# Patient Record
Sex: FEMALE | Race: OTHER | HISPANIC OR LATINO | Employment: FULL TIME | URBAN - METROPOLITAN AREA
[De-identification: names, ages, dates, MRNs, and addresses within clinical notes are randomized per-mention and may not be internally consistent; named-entity substitution may affect disease eponyms.]

---

## 2021-07-22 ENCOUNTER — APPOINTMENT (OUTPATIENT)
Dept: LAB | Facility: HOSPITAL | Age: 26
End: 2021-07-22
Payer: COMMERCIAL

## 2021-07-22 DIAGNOSIS — N91.2 AMENORRHEA: ICD-10-CM

## 2021-07-22 LAB
B-HCG SERPL-ACNC: <2 MIU/ML
FSH SERPL-ACNC: 3 MIU/ML
GLUCOSE SERPL-MCNC: 88 MG/DL (ref 65–140)
PROLACTIN SERPL-MCNC: 11.8 NG/ML
TESTOST SERPL-MCNC: 15 NG/DL
TSH SERPL DL<=0.05 MIU/L-ACNC: 2.37 UIU/ML (ref 0.36–3.74)

## 2021-07-22 PROCEDURE — 84403 ASSAY OF TOTAL TESTOSTERONE: CPT

## 2021-07-22 PROCEDURE — 82947 ASSAY GLUCOSE BLOOD QUANT: CPT

## 2021-07-22 PROCEDURE — 84146 ASSAY OF PROLACTIN: CPT

## 2021-07-22 PROCEDURE — 84702 CHORIONIC GONADOTROPIN TEST: CPT

## 2021-07-22 PROCEDURE — 36415 COLL VENOUS BLD VENIPUNCTURE: CPT

## 2021-07-22 PROCEDURE — 83001 ASSAY OF GONADOTROPIN (FSH): CPT

## 2021-07-22 PROCEDURE — 84443 ASSAY THYROID STIM HORMONE: CPT

## 2022-03-18 ENCOUNTER — HOSPITAL ENCOUNTER (EMERGENCY)
Facility: HOSPITAL | Age: 27
Discharge: HOME | End: 2022-03-18
Attending: EMERGENCY MEDICINE | Admitting: EMERGENCY MEDICINE
Payer: MEDICARE

## 2022-03-18 VITALS
WEIGHT: 229.28 LBS | TEMPERATURE: 98.3 F | DIASTOLIC BLOOD PRESSURE: 90 MMHG | OXYGEN SATURATION: 100 % | HEART RATE: 72 BPM | RESPIRATION RATE: 16 BRPM | SYSTOLIC BLOOD PRESSURE: 124 MMHG

## 2022-03-18 DIAGNOSIS — H57.89 CONTACT LENS STUCK: Primary | ICD-10-CM

## 2022-03-18 PROCEDURE — 65205 REMOVE FOREIGN BODY FROM EYE: CPT | Performed by: EMERGENCY MEDICINE

## 2022-03-18 PROCEDURE — 99283 EMERGENCY DEPT VISIT LOW MDM: CPT

## 2022-03-18 PROCEDURE — 99284 EMERGENCY DEPT VISIT MOD MDM: CPT | Performed by: EMERGENCY MEDICINE

## 2022-03-18 RX ORDER — TETRACAINE HYDROCHLORIDE 5 MG/ML
1 SOLUTION OPHTHALMIC ONCE
Status: COMPLETED | OUTPATIENT
Start: 2022-03-18 | End: 2022-03-18

## 2022-03-18 RX ADMIN — FLUORESCEIN SODIUM 1 STRIP: 0.6 STRIP OPHTHALMIC at 18:26

## 2022-03-18 RX ADMIN — TETRACAINE HYDROCHLORIDE 1 DROP: 5 SOLUTION OPHTHALMIC at 18:26

## 2022-03-18 NOTE — DISCHARGE INSTRUCTIONS
You were seen in the ED after losing a contact  It was retrieved  There were no abrasions noted following removal      Please follow up with your PCP  If you develop eye pain, you may return to the ED or follow up with ophthalmology  Return to the ED for any new or concerning symptoms

## 2022-03-19 NOTE — ED PROVIDER NOTES
History  Chief Complaint   Patient presents with    Eye Pain     pt rubbed her eye hard and her contacts went up under the eyelid     Patient is a 32 YO F, no significant PMHx, who presents to the ED after her contact lens accidentally became stuck under her R eye lid  Patient states this occurred just prior to arrival while rubbing her eyes  After noticing her contact lens was missing, she felt pain under her right eyelid and felt something under there  When she was unable to retrieve it on her own, she came to the ED for further management  Patient states she wears daily contact lenses and this was a new pair  Denies any vision changes  Denies any other new or concerning symptoms  None       Past Medical History:   Diagnosis Date    Miscarriage 02/16/2021       History reviewed  No pertinent surgical history  History reviewed  No pertinent family history  I have reviewed and agree with the history as documented  E-Cigarette/Vaping     E-Cigarette/Vaping Substances     Social History     Tobacco Use    Smoking status: Never Smoker    Smokeless tobacco: Not on file   Substance Use Topics    Alcohol use: Yes     Alcohol/week: 1 0 standard drink     Types: 1 Glasses of wine per week    Drug use: Never        Review of Systems   Eyes: Positive for pain  Negative for photophobia and visual disturbance  Gastrointestinal: Negative for nausea and vomiting  Skin: Negative for wound  Neurological: Negative for headaches  All other systems reviewed and are negative        Physical Exam  ED Triage Vitals [03/18/22 1748]   Temperature Pulse Respirations Blood Pressure SpO2   98 3 °F (36 8 °C) 72 16 124/90 100 %      Temp Source Heart Rate Source Patient Position - Orthostatic VS BP Location FiO2 (%)   Oral Monitor Sitting Right arm --      Pain Score       --             Orthostatic Vital Signs  Vitals:    03/18/22 1748   BP: 124/90   Pulse: 72   Patient Position - Orthostatic VS: Sitting Physical Exam  Vitals and nursing note reviewed  Constitutional:       General: She is not in acute distress  Appearance: She is well-developed  She is not diaphoretic  HENT:      Head: Normocephalic and atraumatic  Right Ear: External ear normal       Left Ear: External ear normal       Nose: Nose normal    Eyes:      General: Lids are normal  No scleral icterus  Right eye: Foreign body present  Extraocular Movements: Extraocular movements intact  Right eye: Normal extraocular motion  Left eye: Normal extraocular motion  Conjunctiva/sclera:      Right eye: Hemorrhage present  Pupils: Pupils are equal, round, and reactive to light  Right eye: No corneal abrasion or fluorescein uptake  Comments: Right eyelid everted, torn contact lens found  Two pieces removed without difficulty  No residual foreign bodies  Pulmonary:      Effort: Pulmonary effort is normal  No respiratory distress  Musculoskeletal:         General: Normal range of motion  Cervical back: Normal range of motion and neck supple  Skin:     General: Skin is warm and dry  Neurological:      General: No focal deficit present  Mental Status: She is alert        Gait: Gait normal    Psychiatric:         Mood and Affect: Mood normal          Behavior: Behavior normal          ED Medications  Medications   tetracaine 0 5 % ophthalmic solution 1 drop (1 drop Right Eye Given by Other 3/18/22 1826)   fluorescein sodium sterile ophthalmic strip 1 strip (1 strip Right Eye Given by Other 3/18/22 1826)       Diagnostic Studies  Results Reviewed     None                 No orders to display         Procedures  Foreign Body - Ocular    Date/Time: 3/18/2022 6:22 PM  Performed by: Charisma Reid DO  Authorized by: Charisma Reid DO     Patient location:  ED  Other Assisting Provider: No    Consent:     Consent obtained:  Verbal    Consent given by:  Patient    Risks discussed:  Pain Alternatives discussed:  No treatment  Universal protocol:     Procedure explained and questions answered to patient or proxy's satisfaction: yes      Required blood products, implants, devices, and special equipment available: yes      Immediately prior to procedure, a time out was called: yes      Patient identity confirmed:  Hospital-assigned identification number  Location:     Location:  R upper eyelid    Depth:  Superficial  Pre-procedure details:     Imaging:  None  Anesthesia (see MAR for exact dosages):     Local anesthetic:  None  Procedure details:     Localization method:  Eyelid eversion and direct visualization    Removal mechanism:  Moist cotton swab    Foreign bodies recovered:  2    Description: Torn contact lens    Intact foreign body removal: no      Residual rust ring observed: no      Residual rust ring removed: no    Post-procedure details:     Confirmation:  No additional foreign bodies on visualization    Patient tolerance of procedure: Tolerated well, no immediate complications  Comments:      No fluorescein uptake following removal of contact lens          ED Course                             SBIRT 22yo+      Most Recent Value   SBIRT (24 yo +)    In order to provide better care to our patients, we are screening all of our patients for alcohol and drug use  Would it be okay to ask you these screening questions? No Filed at: 03/18/2022 1819                [facility]  Number of Diagnoses or Management Options  Contact lens stuck  Diagnosis management comments: Patient is a 33 y o  female who presents to the ED after accidentally getting her contact lens stuck under her R eyelid  Contact lens removed without difficulty  No corneal abrasions noted  Will d/c with ophthalmology f/u PRN  Portions of the record may have been created with voice recognition software   Occasional wrong word or "sound a like" substitutions may have occurred due to the inherent limitations of voice recognition software  Read the chart carefully and recognize, using context, where substitutions have occurred  Risk of Complications, Morbidity, and/or Mortality  Presenting problems: low  Diagnostic procedures: low  Management options: minimal    Patient Progress  Patient progress: stable      Disposition  Final diagnoses:   Contact lens stuck     Time reflects when diagnosis was documented in both MDM as applicable and the Disposition within this note     Time User Action Codes Description Comment    3/18/2022  6:26 PM Garrett Mirza Add [H57 89] Contact lens stuck       ED Disposition     ED Disposition Condition Date/Time Comment    Discharge Stable Fri Mar 18, 2022  6:26 PM Xin Gant discharge to home/self care  Follow-up Information     Follow up With Specialties Details Why Contact Info Additional Information    [address] Emergency Department Emergency Medicine  As needed [facility] [address]  [address] [facility] Emergency Department, [address] [place] [address] 200  Call in 1 day  [phone]       Silva Holman MD Ophthalmology   [address] [facility] [id number] [phone]             There are no discharge medications for this patient  No discharge procedures on file  PDMP Review     None           ED Provider  Attending physically available and evaluated Xin Gant I managed the patient along with the ED Attending      Electronically Signed by         Yanna Morales DO  03/19/22 2085

## 2022-07-23 ENCOUNTER — HOSPITAL ENCOUNTER (EMERGENCY)
Facility: HOSPITAL | Age: 27
Discharge: HOME | End: 2022-07-23
Attending: EMERGENCY MEDICINE | Admitting: EMERGENCY MEDICINE
Payer: MEDICARE

## 2022-07-23 VITALS
TEMPERATURE: 98 F | SYSTOLIC BLOOD PRESSURE: 124 MMHG | RESPIRATION RATE: 17 BRPM | WEIGHT: 232.37 LBS | HEIGHT: 66 IN | HEART RATE: 88 BPM | DIASTOLIC BLOOD PRESSURE: 57 MMHG | BODY MASS INDEX: 37.34 KG/M2 | OXYGEN SATURATION: 100 %

## 2022-07-23 DIAGNOSIS — H00.014 HORDEOLUM EXTERNUM OF LEFT UPPER EYELID: Primary | ICD-10-CM

## 2022-07-23 PROCEDURE — 99282 EMERGENCY DEPT VISIT SF MDM: CPT

## 2022-07-23 PROCEDURE — 99282 EMERGENCY DEPT VISIT SF MDM: CPT | Performed by: PHYSICIAN ASSISTANT

## 2022-07-23 NOTE — ED PROVIDER NOTES
History  Chief Complaint   Patient presents with    Eye Swelling     Pt reports 2 weeks ago, L eye swelling  Saw eye dr and prescribed abx drops, used them and pain/swelling went away  States for 1 week, more swelling noted to "inner eye"  Patient is a 81-year-old female with no significant past medical history who presents with left upper eyelid swelling for approximately 1 week  Patient reports that 2 weeks ago she started with swelling of the upper outer eyelid  She went into see her ophthalmologist, who prescribed her prednisolone eyedrops, which she completed a 5 day course, which resolved the issue  Several days later she noted swelling again of the left upper inner eyelid, similar to her previous symptoms  She denies any associated vision changes, eye pain, drainage from the eye, foreign body sensation in the eye, known injury to the eye, pain with eye movements, surrounding erythema  Patient has not tried anything to help alleviate symptoms  Patient states she is otherwise in her usual state health denies any fevers, chills, diaphoresis, headache, congestion, cough, shortness of breath, chest pain, abdominal pain, nausea, vomiting, diarrhea, rash  Patient does wear contacts, but has not worn them since the onset of her symptoms 2 weeks ago  Patient is in the process of getting new glasses from ophthalmologist           None       Past Medical History:   Diagnosis Date    Miscarriage 02/16/2021       History reviewed  No pertinent surgical history  History reviewed  No pertinent family history  I have reviewed and agree with the history as documented  E-Cigarette/Vaping     E-Cigarette/Vaping Substances     Social History     Tobacco Use    Smoking status: Never Smoker   Substance Use Topics    Alcohol use:  Yes     Alcohol/week: 1 0 standard drink     Types: 1 Glasses of wine per week    Drug use: Never       Review of Systems   Constitutional: Negative for chills, diaphoresis and fever    HENT: Negative for congestion, rhinorrhea and sinus pressure  Eyes: Negative for photophobia, pain, discharge, redness, itching and visual disturbance  Swelling of the left inner upper eyelid   Respiratory: Negative for cough and shortness of breath  Cardiovascular: Negative for chest pain, palpitations and leg swelling  Gastrointestinal: Negative for abdominal pain, diarrhea, nausea and vomiting  Genitourinary: Negative for difficulty urinating  Skin: Negative for color change, pallor and rash  Neurological: Negative for light-headedness and headaches  All other systems reviewed and are negative  Physical Exam  Physical Exam  Vitals and nursing note reviewed  Constitutional:       General: She is awake  She is not in acute distress  Appearance: She is well-developed  She is not ill-appearing, toxic-appearing or diaphoretic  HENT:      Head: Normocephalic and atraumatic  Right Ear: External ear normal       Left Ear: External ear normal       Nose: Nose normal    Eyes:      General: Lids are everted, no foreign bodies appreciated  Vision grossly intact  No scleral icterus  Left eye: Hordeolum present  No foreign body or discharge  Extraocular Movements: Extraocular movements intact  Conjunctiva/sclera: Conjunctivae normal       Pupils: Pupils are equal, round, and reactive to light  Comments: EOMs intact without pain  No conjunctival injection noted  Hordeolum noted of the left upper inner eyelid  Neck:      Vascular: No JVD  Trachea: No tracheal deviation  Cardiovascular:      Rate and Rhythm: Normal rate and regular rhythm  Heart sounds: Normal heart sounds  Pulmonary:      Effort: Pulmonary effort is normal  No tachypnea or respiratory distress  Breath sounds: Normal breath sounds  Abdominal:      General: There is no distension  Musculoskeletal:         General: No deformity  Normal range of motion        Cervical back: Normal range of motion and neck supple  Skin:     General: Skin is dry  Neurological:      Mental Status: She is alert and oriented to person, place, and time  GCS: GCS eye subscore is 4  GCS verbal subscore is 5  GCS motor subscore is 6  Psychiatric:         Behavior: Behavior normal  Behavior is cooperative  Vital Signs  ED Triage Vitals   Temperature Pulse Respirations Blood Pressure SpO2   07/23/22 1111 07/23/22 1111 07/23/22 1111 07/23/22 1111 07/23/22 1111   98 °F (36 7 °C) 88 17 124/57 100 %      Temp Source Heart Rate Source Patient Position - Orthostatic VS BP Location FiO2 (%)   07/23/22 1111 07/23/22 1111 07/23/22 1111 07/23/22 1111 --   Oral Monitor Sitting Right arm       Pain Score       07/23/22 1112       No Pain           Vitals:    07/23/22 1111   BP: 124/57   Pulse: 88   Patient Position - Orthostatic VS: Sitting         Visual Acuity  Visual Acuity    Flowsheet Row Most Recent Value   Visual acuity R eye is 20/30   Visual acuity Left eye is 20/30   Visual acuity in both eyes is 20/25   Wearing corrective eyewear/lenses? Yes          ED Medications  Medications - No data to display    Diagnostic Studies  Results Reviewed     None                 No orders to display              Procedures  Procedures         ED Course                                             MDM  Number of Diagnoses or Management Options  Hordeolum externum of left upper eyelid  Diagnosis management comments: Exam consistent with stye  Reviewed treatment at home  Recommended follow-up with ophthalmologist for further evaluation and monitoring of symptoms  Recommended follow-up with PCP as needed for monitoring of symptoms  The management plan was discussed in detail with the patient at bedside and all questions were answered  Provided both verbal and written instructions  Reviewed red flag symptoms and strict return to ED instructions   Patient notes understanding and agrees to plan       Disposition  Final diagnoses:   Hordeolum externum of left upper eyelid     Time reflects when diagnosis was documented in both MDM as applicable and the Disposition within this note     Time User Action Codes Description Comment    7/23/2022 12:24 PM Kacey Landa Add [P62 [id number]] Hordeolum externum of left upper eyelid       ED Disposition     ED Disposition   Discharge    Condition   Stable    Date/Time   Sat Jul 23, 2022 12:24 PM    [address] discharge to home/self care  Follow-up Information     Follow up With Specialties Details Why Contact Info Additional [address] [facility] Emergency Department Emergency Medicine  If symptoms worsen [facility] [id number]  [address] [place] [address] Emergency Department, [address] [place] [address], [id number]    Follow-up with ophthalmologist for further evaluation and monitoring of symptoms         [facility]    [address]  [phone]           There are no discharge medications for this patient  No discharge procedures on file      PDMP Review     None          ED Provider  Electronically Signed by           Di Calzada PA-C  07/23/22 [facility]  07/23/22 0977

## 2022-07-23 NOTE — DISCHARGE INSTRUCTIONS
Apply warm compresses  Keep the area clean, gently wash with baby soap over the external surface of the eyelid    Follow-up with ophthalmologist for further evaluation of symptoms  Return to ED if symptoms worsen including increasing swelling, pain, vision changes, pus draining from the eye, redness of the eye, fevers

## 2022-12-19 ENCOUNTER — HOSPITAL ENCOUNTER (EMERGENCY)
Facility: HOSPITAL | Age: 27
Discharge: HOME | End: 2022-12-19
Attending: EMERGENCY MEDICINE

## 2022-12-19 ENCOUNTER — APPOINTMENT (EMERGENCY)
Dept: CT IMAGING | Facility: HOSPITAL | Age: 27
End: 2022-12-19

## 2022-12-19 VITALS
RESPIRATION RATE: 18 BRPM | SYSTOLIC BLOOD PRESSURE: 132 MMHG | WEIGHT: 233.69 LBS | OXYGEN SATURATION: 99 % | BODY MASS INDEX: 37.72 KG/M2 | HEART RATE: 73 BPM | DIASTOLIC BLOOD PRESSURE: 72 MMHG | TEMPERATURE: 98.2 F

## 2022-12-19 DIAGNOSIS — N63.0 BREAST NODULE: ICD-10-CM

## 2022-12-19 DIAGNOSIS — R07.81 RIB PAIN: ICD-10-CM
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
## 2022-12-19 NOTE — DISCHARGE INSTRUCTIONS
DISCHARGE INSTRUCTIONS:    FOLLOW UP WITH YOUR PRIMARY CARE PROVIDER OR THE [address] [place] [address]  MAKE AN APPOINTMENT TO BE SEEN  FOLLOW UP FOR YOUR BREAST NODULE  TAKE TYLENOL OR MOTRIN FOR PAIN  REST  IF SYMPTOMS WORSEN OR NEW SYMPTOMS ARISE, RETURN TO THE ER TO BE SEEN

## 2022-12-19 NOTE — ED PROVIDER NOTES
History  Chief Complaint   Patient presents with   • Fall     Pt fell yesterday face first, phone was around neck, and got caught under L sided of chest   Pt denies SOB  Pt c/o L breast pain  Pt denies taking medication PTA       27y  o female with no significant PMH presents to the ER for left anterior rib pain since yesterday  Patient states she fell onto her phone yesterday causing an abrasion to the left side of her chest  She denies taking any medication for symptoms  She describes her pain as sharp and non-radiating  Pain is constant  She denies hitting her head or LOC  She denies fever, chills, URI symptoms, chest pain, dyspnea, N/V/D, abdominal pain, weakness or paresthesias  History provided by:  Patient   used: No        None       Past Medical History:   Diagnosis Date   • Miscarriage 02/16/2021       History reviewed  No pertinent surgical history  History reviewed  No pertinent family history  I have reviewed and agree with the history as documented  E-Cigarette/Vaping     E-Cigarette/Vaping Substances     Social History     Tobacco Use   • Smoking status: Never   Substance Use Topics   • Alcohol use: Yes     Alcohol/week: 1 0 standard drink     Types: 1 Glasses of wine per week   • Drug use: Never       Review of Systems   Constitutional: Negative for activity change, appetite change, chills and fever  HENT: Negative for congestion, drooling, ear discharge, ear pain, facial swelling, rhinorrhea and sore throat  Eyes: Negative for redness  Respiratory: Negative for cough and shortness of breath  Cardiovascular: Negative for chest pain  Gastrointestinal: Negative for abdominal pain, diarrhea, nausea and vomiting  Musculoskeletal: Negative for neck stiffness  Skin: Negative for rash  Allergic/Immunologic: Negative for food allergies  Neurological: Negative for weakness and numbness  Physical Exam  Physical Exam  Vitals and nursing note reviewed  Constitutional:       General: She is not in acute distress  Appearance: She is not toxic-appearing  HENT:      Head: Normocephalic and atraumatic  Eyes:      Conjunctiva/sclera: Conjunctivae normal    Neck:      Trachea: No tracheal deviation  Cardiovascular:      Rate and Rhythm: Normal rate and regular rhythm  Heart sounds: Normal heart sounds, S1 normal and S2 normal  No murmur heard  No friction rub  No gallop  Pulmonary:      Effort: Pulmonary effort is normal  No respiratory distress  Breath sounds: Normal breath sounds  No decreased breath sounds, wheezing, rhonchi or rales  Chest:      Chest wall: Tenderness present  No deformity, swelling, crepitus or edema  Abdominal:      General: Bowel sounds are normal  There is no distension  Palpations: Abdomen is soft  Tenderness: There is no abdominal tenderness  There is no guarding or rebound  Musculoskeletal:      Cervical back: Normal range of motion and neck supple  Skin:     General: Skin is warm and dry  Findings: No rash  Neurological:      Mental Status: She is alert  GCS: GCS eye subscore is 4  GCS verbal subscore is 5  GCS motor subscore is 6  Psychiatric:         Mood and Affect: Mood normal          Vital Signs  ED Triage Vitals [12/19/22 1426]   Temperature Pulse Respirations Blood Pressure SpO2   98 2 °F (36 8 °C) 73 18 132/72 99 %      Temp Source Heart Rate Source Patient Position - Orthostatic VS BP Location FiO2 (%)   Oral Monitor -- Right arm --      Pain Score       8           Vitals:    12/19/22 1426   BP: 132/72   Pulse: 73         Visual Acuity      ED Medications  Medications - No data to display    Diagnostic Studies  Results Reviewed     None                 CT chest without contrast   Final Result by Betsey Lilly MD (12/19 4666)      Slight asymmetry of the left serratus anterior muscle compared to the right at the level of the 8th rib posterolaterally    This may be related to a small intramuscular hematoma  Recommend correlation for point tenderness  No other evidence of acute thoracic trauma  In particular, no rib fracture  Indeterminate 2 0 x 1 4 cm hypodense nodule or enlarged lymph node in the upper outer right breast, just superficial to the lateral aspect of the right pectoralis major muscle  Recommend further evaluation with dedicated breast imaging  Additional findings as above  The study was marked in [facility] for immediate notification  Workstation performed: [id number]                    Procedures  Procedures         ED Course                               SBIRT 22yo+    Flowsheet Row Most Recent Value   SBIRT (25 yo +)    In order to provide better care to our patients, we are screening all of our patients for alcohol and drug use  Would it be okay to ask you these screening questions? No Filed at: 12/19/2022 1434                    MDM  Number of Diagnoses or Management Options  Breast nodule: new and requires workup  Fall, initial encounter: new and requires workup  Rib pain: new and requires workup  Diagnosis management comments:     27y  o female presents to the ER for left anterior rib pain since yesterday after falling  Vitals stable  Patient in no acute distress  On exam, patient has anterior left lower rib pain  Small abrasion seen  No erythema, swelling, bleeding or ecchymosis  Lungs are clear  Abdomen is soft and non-tender  Will check imaging  1630 - Informed patient of all CT findings  Instructed follow up for findings  Patient agreeable  The management plan was discussed in detail with the patient at bedside and all questions were answered  Prior to discharge, we provided both verbal and written instructions  We discussed with the patient the signs and symptoms for which to return to the emergency department  All questions were answered and patient was comfortable with the plan of care and discharged to home    Instructed the patient to follow up with the primary care provider and/or specialist provided and their written instructions  The patient verbalized understanding of our discussion and plan of care, and agrees to return to the Emergency Department for concerns and progression of illness  At discharge, I instructed the patient to:  -follow up with pcp  -follow up with women's health for CT findings of breast nodule/lymph node  -take Tylenol or Motrin for pain  -rest and ice the area  -return to the ER if symptoms worsened or new symptoms arose  Patient agreed to this plan and was stable at time of discharge  Amount and/or Complexity of Data Reviewed  Tests in the radiology section of CPT®: ordered and reviewed    Patient Progress  Patient progress: stable      Disposition  Final diagnoses:   Fall, initial encounter   Rib pain   Breast nodule     Time reflects when diagnosis was documented in both MDM as applicable and the Disposition within this note     Time User Action Codes Description Comment    12/19/2022  4:37 PM Griselda Lava Add [I86  HOME] Fall, initial encounter     12/19/2022  4:37 PM Hailey Clore A Add [R07 81] Rib pain     12/19/2022  4:38 PM Hailey Clore A Add [N63 0] Breast nodule       ED Disposition     ED Disposition   Discharge    Condition   Stable    Date/Time   Mon Dec 19, 2022  4:37 PM    [address] discharge to home/self care                 Follow-up Information     Follow up With Specialties Details Why Contact Info Additional [address] [facility] Schedule an appointment as soon as possible for a visit   [address], [address] [facility] [address]  [address], [address], [address] [place], [place], [facility] Obstetrics and Gynecology Schedule an appointment as soon as possible for a visit   [address]  [place] [address] [facility] [address], [address], [address] [place] [address], [id number]   [phone]          There are no discharge medications for this patient  No discharge procedures on file      PDMP Review     None          ED Provider  Electronically Signed by           Elias Basilio PA-C  12/19/22 6733

## 2022-12-22 ENCOUNTER — PATIENT OUTREACH (OUTPATIENT)
Dept: OBGYN CLINIC | Facility: CLINIC | Age: 27
End: 2022-12-22

## 2022-12-22 ENCOUNTER — OFFICE VISIT (OUTPATIENT)
Dept: OBGYN CLINIC | Facility: CLINIC | Age: 27
End: 2022-12-22

## 2022-12-22 VITALS
BODY MASS INDEX: 38.28 KG/M2 | SYSTOLIC BLOOD PRESSURE: 104 MMHG | DIASTOLIC BLOOD PRESSURE: 72 MMHG | HEIGHT: 66 IN | WEIGHT: 238.2 LBS | HEART RATE: 78 BPM

## 2022-12-22 DIAGNOSIS — Z13.31 POSITIVE DEPRESSION SCREENING: ICD-10-CM

## 2022-12-22 DIAGNOSIS — R59.0 LYMPHADENOPATHY, AXILLARY: Primary | ICD-10-CM

## 2022-12-22 NOTE — PATIENT INSTRUCTIONS
Thank you for your confidence in our team    We appreciate you and welcome your feedback  If you receive a survey from us, please take a few moments to let us know how we are doing     Sincerely,  Mayra Del Cid, DO

## 2022-12-22 NOTE — PROGRESS NOTES
SUKUMAR CORTES met with pt in the office today after she had a high depression screen at her visit  Pt denies any SI/HI, has occasional passive thoughts of just "not being here"    Pt experiencing symptoms of depression and anxiety for the past two years, reports that symptoms became worse after she experiences a miscarriage in 2021, she also had a failed romantic relationship at that time  Pt reports since that time she has had low motivation, has isolated herself more from family and friends and doesn't enjoy things that she used to  Pt reports that the time when she felt happiest was around 2018 when she was very spiritually active, vegan and taking care of herself more, when she thinks back to where she's would like to be it looks like that time  Pt also having a lot of anxiety in nature around this appointment, pt has a family history of cancer with both her mother and her father  She has an appt at the [facility] in February  SUKUMAR CORTES provided pt with agencies who take her insurance for therapy, she is not interested in medication management  SUKUMAR CORTES will f/u via text in two weeks

## 2022-12-22 NOTE — PROGRESS NOTES
PROBLEM GYNECOLOGICAL VISIT    Nelia Pratt is a 33 y o  female who presents today for ER follow up  Her general medical history has been reviewed and she reports it as follows:    Past Medical History:   Diagnosis Date   • Miscarriage 02/16/2021     No past surgical history on file  OB History    No obstetric history on file  Social History     Tobacco Use   • Smoking status: Never   Substance Use Topics   • Alcohol use: Yes     Alcohol/week: 1 0 standard drink     Types: 1 Glasses of wine per week   • Drug use: Never     Social History     Substance and Sexual Activity   Sexual Activity Yes       No current outpatient medications    History of Present Illness:   Patient presents for follow up s/p ER visit with c/o had fallen and on CT scan of the chest noted a lymph node at around her right breast  Patient denies any family history of breast cancer  Review of Systems:  Review of Systems   All other systems reviewed and are negative  Physical Exam:  /72   Pulse 78   Ht 5' 6" (1 676 m)   Wt 108 kg (238 lb 3 2 oz)   LMP 12/11/2022 (Exact Date)   BMI 38 45 kg/m²   Physical Exam  Genitourinary:   Breasts:     Right: No swelling, bleeding, inverted nipple, mass, nipple discharge, skin change or tenderness  Left: No swelling, bleeding, inverted nipple, mass, nipple discharge, skin change or tenderness  Breast exam comments: Positive bruising on left breast       Lymphadenopathy:      Upper Body:      Right upper body: Axillary adenopathy present  No supraclavicular adenopathy  Left upper body: No supraclavicular or axillary adenopathy  Neurological:      Mental Status: She is alert  Vitals reviewed  Assessment:   1  Right breast lymphadenopathy    Plan:   1  Imaging ordered: Right breast sonogram   2  Return to office 1-3wks after sonogram    3  Patient's depression screening was assessed with a PHQ-2 score of 6  Their PHQ-9 score was 23   Referral placed for  consultation  Reviewed with patient that test results are available in MyChart immediately, but that they will not necessarily be reviewed by me immediately  Explained that I will review results at my earliest opportunity and contact patient appropriately

## 2023-01-03 ENCOUNTER — OFFICE VISIT (OUTPATIENT)
Dept: FAMILY MEDICINE CLINIC | Facility: CLINIC | Age: 28
End: 2023-01-03

## 2023-01-03 VITALS
RESPIRATION RATE: 16 BRPM | DIASTOLIC BLOOD PRESSURE: 76 MMHG | HEIGHT: 66 IN | HEART RATE: 70 BPM | SYSTOLIC BLOOD PRESSURE: 102 MMHG | BODY MASS INDEX: 37.72 KG/M2 | WEIGHT: 234.7 LBS | OXYGEN SATURATION: 98 % | TEMPERATURE: 97.1 F

## 2023-01-03 DIAGNOSIS — S29.9XXD TRAUMA OF CHEST, SUBSEQUENT ENCOUNTER: Primary | ICD-10-CM

## 2023-01-03 DIAGNOSIS — N64.9 BREAST LESION: ICD-10-CM

## 2023-01-03 PROBLEM — S29.9XXA TRAUMA OF CHEST: Status: ACTIVE | Noted: 2023-01-03

## 2023-01-03 NOTE — ASSESSMENT & PLAN NOTE
-chest CT in ED on 12/19  found incidental right breast lesion; followed with OB on 12/22; has U/S of right breast ordered and scheduled for February 2023  (in the context of family history of ovarian cancer in MOM at age 28: does not know the rest of family history)     PLAN  -continue to f/u with OB   -consider ambulatory referral to oncology genetics

## 2023-01-03 NOTE — ASSESSMENT & PLAN NOTE
-presented to ED on 12/19 please look to HPI for more details  -CT on 12/19 negative for fractures, positive for possible intramuscular hematoma    PLAN  -reviewed CT scan, counseled on hematoma, and conservative management   -exercise as tolerated  -go to ED if sudden shortness of breath

## 2023-01-03 NOTE — PROGRESS NOTES
Name: Lonnie Desai      : 1995      MRN: [id number]  Encounter Provider: Katheryn Matson MD  Encounter Date: 1/3/2023   Encounter department: [address] [place] [address]     1  Trauma of chest, subsequent encounter  Assessment & Plan:  -presented to ED on  please look to HPI for more details  -CT on  negative for fractures, positive for possible intramuscular hematoma    PLAN  -reviewed CT scan, counseled on hematoma, and conservative management   -exercise as tolerated  -go to ED if sudden shortness of breath      2  Breast lesion  Assessment & Plan:  -chest CT in ED on   found incidental right breast lesion; followed with OB on ; has U/S of right breast ordered and scheduled for 2023  (in the context of family history of ovarian cancer in MOM at age 28: does not know the rest of family history)     PLAN  -continue to f/u with OB   -consider ambulatory referral to oncology genetics           Subjective        34 yo female patient presents today to follow-up concerning fall on 2022 with trauma to left chest secondary to shoulder strapped phone bag  Patient reports improvement compared to initial injury  Only feels pain when lies prone  No increased pain with deep breaths  Pain is 5/10 when occurs  Sleeps on back without issue  No radiation of pain, located underneath left breast towards midline  Review of Systems   Constitutional: Negative for fever  Respiratory: Negative for chest tightness and shortness of breath  Cardiovascular: Positive for chest pain  Negative for palpitations  Gastrointestinal: Negative for abdominal pain  Neurological: Negative for dizziness, light-headedness and headaches  No current outpatient medications on file prior to visit         Objective     /76 (BP Location: Left arm, Patient Position: Sitting, Cuff Size: Adult)   Pulse 70   Temp (!) 97 1 °F (36 2 °C) (Temporal)   Resp 16   Ht 5' 6" (1 676 m)   Wt 106 kg (234 lb 11 2 oz)   LMP 12/17/2022 (Exact Date)   SpO2 98%   BMI 37 88 kg/m²     Physical Exam  Constitutional:       Appearance: Normal appearance  HENT:      Head: Normocephalic and atraumatic  Right Ear: External ear normal       Left Ear: External ear normal       Nose: Nose normal    Eyes:      Extraocular Movements: Extraocular movements intact  Conjunctiva/sclera: Conjunctivae normal    Cardiovascular:      Rate and Rhythm: Normal rate and regular rhythm  Heart sounds: No murmur heard  No friction rub  No gallop  Pulmonary:      Effort: Pulmonary effort is normal       Breath sounds: Normal breath sounds  No wheezing, rhonchi or rales  Chest:      Chest wall: No tenderness  Musculoskeletal:      Cervical back: Normal range of motion  Skin:     General: Skin is warm  Capillary Refill: Capillary refill takes less than 2 seconds  Findings: Bruising present  Comments: With mild tenderness to deep pressure   Neurological:      General: No focal deficit present  Mental Status: She is alert and oriented to person, place, and time  Psychiatric:         Mood and Affect: Mood normal          Behavior: Behavior normal          Thought Content:  Thought content normal          Judgment: Judgment normal        Vandana Virgen MD

## 2023-01-06 ENCOUNTER — PATIENT OUTREACH (OUTPATIENT)
Dept: OBGYN CLINIC | Facility: CLINIC | Age: 28
End: 2023-01-06

## 2023-01-06 NOTE — PROGRESS NOTES
SUKUMAR CORTES attempted to call patient to see if she connected with therapy services, VM box is full

## 2023-01-13 ENCOUNTER — PATIENT OUTREACH (OUTPATIENT)
Dept: OBGYN CLINIC | Facility: CLINIC | Age: 28
End: 2023-01-13

## 2023-01-20 ENCOUNTER — PATIENT OUTREACH (OUTPATIENT)
Dept: OBGYN CLINIC | Facility: CLINIC | Age: 28
End: 2023-01-20

## 2023-02-02 ENCOUNTER — TELEPHONE (OUTPATIENT)
Dept: FAMILY MEDICINE CLINIC | Facility: CLINIC | Age: 28
End: 2023-02-02

## 2023-02-02 ENCOUNTER — HOSPITAL ENCOUNTER (OUTPATIENT)
Dept: MAMMOGRAPHY | Facility: CLINIC | Age: 28
End: 2023-02-02

## 2023-02-02 DIAGNOSIS — R59.0 LYMPHADENOPATHY, AXILLARY: ICD-10-CM

## 2023-02-02 NOTE — TELEPHONE ENCOUNTER
02/02/23    first attempt to contact patient  no answer    Left detailed message      If pt contacts office, please assist pt with rescheduling 02/08/23 appt  Pcp won’t be available

## 2023-02-15 ENCOUNTER — TELEPHONE (OUTPATIENT)
Dept: FAMILY MEDICINE CLINIC | Facility: CLINIC | Age: 28
End: 2023-02-15

## 2023-02-15 NOTE — TELEPHONE ENCOUNTER
Left message to call clinic let us know when would be good time to call back or to set up telephone visit  Explained would benefit from genetics evaluation in light of family history of breast cancer

## 2023-02-16 ENCOUNTER — TELEPHONE (OUTPATIENT)
Dept: OBGYN CLINIC | Facility: CLINIC | Age: 28
End: 2023-02-16

## 2023-07-11 ENCOUNTER — TELEPHONE (OUTPATIENT)
Dept: OBGYN CLINIC | Facility: CLINIC | Age: 28
End: 2023-07-11

## 2024-03-04 ENCOUNTER — OFFICE VISIT (OUTPATIENT)
Dept: FAMILY MEDICINE CLINIC | Facility: CLINIC | Age: 29
End: 2024-03-04

## 2024-03-04 ENCOUNTER — TELEPHONE (OUTPATIENT)
Dept: FAMILY MEDICINE CLINIC | Facility: CLINIC | Age: 29
End: 2024-03-04

## 2024-03-04 VITALS
OXYGEN SATURATION: 99 % | BODY MASS INDEX: 37.28 KG/M2 | HEART RATE: 94 BPM | TEMPERATURE: 98.7 F | RESPIRATION RATE: 16 BRPM | WEIGHT: 232 LBS | SYSTOLIC BLOOD PRESSURE: 126 MMHG | DIASTOLIC BLOOD PRESSURE: 80 MMHG | HEIGHT: 66 IN

## 2024-03-04 DIAGNOSIS — M54.50 ACUTE BILATERAL LOW BACK PAIN WITHOUT SCIATICA: ICD-10-CM

## 2024-03-04 DIAGNOSIS — R10.30 LOWER ABDOMINAL PAIN: Primary | ICD-10-CM

## 2024-03-04 LAB — SL AMB POCT URINE HCG: NORMAL

## 2024-03-04 PROCEDURE — 81025 URINE PREGNANCY TEST: CPT | Performed by: FAMILY MEDICINE

## 2024-03-04 PROCEDURE — 99213 OFFICE O/P EST LOW 20 MIN: CPT | Performed by: FAMILY MEDICINE

## 2024-03-04 RX ORDER — MELOXICAM 15 MG/1
15 TABLET ORAL DAILY
Qty: 15 TABLET | Refills: 0 | Status: SHIPPED | OUTPATIENT
Start: 2024-03-04

## 2024-03-04 NOTE — PROGRESS NOTES
Name: Roberta Bucio      : 1995      MRN: [id number]  Encounter Provider: Julia Stevenson MD  Encounter Date: 3/4/2024   Encounter department: [facility]    Assessment & Plan     1. Lower abdominal pain  Assessment & Plan:  Non-cyclic lower abdominal pain without accompanying n/v/d present for less than 3 months s/p D&C 2 moths ago  Negative POCT HcG  Ddx: Adhesions, Endometriosis, dysmenorrhea,leiomyoma, ovarian cyst    - Discussed strict ED precautions  - Will obtain imaging as ordered and follow up  - NSAIDs daily with heating pad for a week  - Referred to GYN  - RTC if worsen      Orders:  -     US pelvis complete w transvaginal; Future; Expected date: 2024  -     POCT urine HCG  -     UA w Reflex to Microscopic w Reflex to Culture; Future  -     meloxicam (Mobic) 15 mg tablet; Take 1 tablet (15 mg total) by mouth daily  -     Ambulatory Referral to Obstetrics / Gynecology; Future    2. Acute bilateral low back pain without sciatica        Depression Screening and Follow-up Plan: Patient was screened for depression during today's encounter. They screened negative with a PHQ-2 score of 0.        Subjective        Roberta Bucio is a 29 y.o. female presenting for same day visit for lower abdominal pain and back pain for a month. She reportedly has heavy periods with changes of tampon and sanitary pad 2 x daily. She s currently on day 1 of her menstrual period; usually 5 days long and occurs every 28-30 days. She denies dyspareunia, or moderate to severe dysmenorrhea, but has mild cramps that is relieved by pain medication. 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 She also had a D&C 2 months ago.       Review of Systems   Constitutional:  Negative for fatigue and fever.   Respiratory: Negative.     Gastrointestinal:  Positive for abdominal pain.   Genitourinary:  Negative for vaginal discharge and vaginal pain.  "  Musculoskeletal:  Positive for back pain.   Neurological:  Negative for dizziness and light-headedness.       No current outpatient medications on file prior to visit.       Objective     /80 (BP Location: Right arm, Patient Position: Sitting, Cuff Size: Large)   Pulse 94   Temp 98.7 °F (37.1 °C) (Temporal)   Resp 16   Ht 5' 6\" (1.676 m)   Wt 105 kg (232 lb)   LMP 03/04/2024   SpO2 99%   Breastfeeding No   BMI 37.45 kg/m²     Physical Exam  Vitals reviewed.   Constitutional:       General: She is not in acute distress.     Appearance: Normal appearance. She is obese. She is not ill-appearing, toxic-appearing or diaphoretic.   HENT:      Head: Atraumatic.      Nose: No congestion or rhinorrhea.   Eyes:      General: No scleral icterus.     Extraocular Movements: Extraocular movements intact.   Cardiovascular:      Rate and Rhythm: Normal rate and regular rhythm.      Heart sounds: Normal heart sounds.   Pulmonary:      Breath sounds: Normal breath sounds.   Abdominal:      General: Bowel sounds are normal. There is no distension.      Palpations: Abdomen is soft. There is no mass.      Tenderness: There is abdominal tenderness. There is no right CVA tenderness, left CVA tenderness, guarding or rebound.          Comments: Lower abdominal tenderness   Skin:     General: Skin is warm.   Neurological:      General: No focal deficit present.      Mental Status: She is alert.   Psychiatric:         Behavior: Behavior normal.       Julia Stevenson MD    "

## 2024-03-04 NOTE — LETTER
March 4, 2024     Patient: Roberta Bucio  YOB: 1995  Date of Visit: 3/4/2024      To Whom it May Concern:    Roberta Bucio is under my professional care. Roberta was seen in my office on 3/4/2024. Roberta may return to work on 3/6/24 .    If you have any questions or concerns, please don't hesitate to call.         Sincerely,          Julia Stevenson MD        CC: No Recipients

## 2024-03-04 NOTE — PATIENT INSTRUCTIONS
While taking Mobic (meloxicam) do not take any other NSAIDs such as Advil, Motrin, ibuprofen, Celebrex, naproxen or Aleve.  However you may take Tylenol (acetaminophen).  You may also take Tylenol 500mg every 4-6 hours as needed OR max 1,000mg per dose up to 3 times per day for a total of 3,000mg per day     Take med with food. Use heating pad on affected areas 2-3 daily.

## 2024-03-05 ENCOUNTER — TELEPHONE (OUTPATIENT)
Dept: FAMILY MEDICINE CLINIC | Facility: CLINIC | Age: 29
End: 2024-03-05

## 2024-03-05 NOTE — TELEPHONE ENCOUNTER
Hey, how are you? I'm Amaya Bucio. I'm calling to see if there is anything available tomorrow to get checked with the for the sorry again histologist. If not, we can schedule an appointment. My phone number is [phone]. Five. Thank you.      Appointment has been scheduled for 3/20/24

## 2024-03-06 PROBLEM — R10.30 LOWER ABDOMINAL PAIN: Status: ACTIVE | Noted: 2024-03-06

## 2024-03-06 PROBLEM — M54.50 ACUTE BILATERAL LOW BACK PAIN WITHOUT SCIATICA: Status: ACTIVE | Noted: 2024-03-06

## 2024-03-06 NOTE — ASSESSMENT & PLAN NOTE
Non-cyclic lower abdominal pain without accompanying n/v/d present for less than 3 months s/p D&C 2 moths ago  Negative POCT HcG  Ddx: Adhesions, Endometriosis, dysmenorrhea,leiomyoma, ovarian cyst    - Discussed strict ED precautions  - Will obtain imaging as ordered and follow up  - NSAIDs daily with heating pad for a week  - Referred to GYN  - RTC if worsen

## 2024-03-07 ENCOUNTER — HOSPITAL ENCOUNTER (OUTPATIENT)
Dept: ULTRASOUND IMAGING | Facility: HOSPITAL | Age: 29
End: 2024-03-07
Payer: MEDICARE

## 2024-03-07 DIAGNOSIS — R10.30 LOWER ABDOMINAL PAIN: ICD-10-CM

## 2024-03-07 PROCEDURE — 76830 TRANSVAGINAL US NON-OB: CPT

## 2024-03-07 PROCEDURE — 76856 US EXAM PELVIC COMPLETE: CPT
